# Patient Record
Sex: FEMALE | Race: WHITE | Employment: FULL TIME
[De-identification: names, ages, dates, MRNs, and addresses within clinical notes are randomized per-mention and may not be internally consistent; named-entity substitution may affect disease eponyms.]

---

## 2017-02-16 ENCOUNTER — OFFICE VISIT (OUTPATIENT)
Dept: FAMILY MEDICINE CLINIC | Facility: CLINIC | Age: 22
End: 2017-02-16

## 2017-02-16 VITALS
DIASTOLIC BLOOD PRESSURE: 64 MMHG | TEMPERATURE: 97.4 F | HEART RATE: 98 BPM | OXYGEN SATURATION: 98 % | SYSTOLIC BLOOD PRESSURE: 112 MMHG | BODY MASS INDEX: 44.83 KG/M2 | WEIGHT: 253 LBS | HEIGHT: 63 IN

## 2017-02-16 DIAGNOSIS — H65.93 OME (OTITIS MEDIA WITH EFFUSION), BILATERAL: ICD-10-CM

## 2017-02-16 DIAGNOSIS — J01.40 ACUTE NON-RECURRENT PANSINUSITIS: Primary | ICD-10-CM

## 2017-02-16 PROCEDURE — 99213 OFFICE O/P EST LOW 20 MIN: CPT | Performed by: NURSE PRACTITIONER

## 2017-02-16 RX ORDER — PREDNISONE 20 MG/1
TABLET ORAL
COMMUNITY
Start: 2017-02-13 | End: 2017-02-16 | Stop reason: ALTCHOICE

## 2017-02-16 RX ORDER — AMOXICILLIN AND CLAVULANATE POTASSIUM 875; 125 MG/1; MG/1
1 TABLET, FILM COATED ORAL 2 TIMES DAILY
Qty: 20 TABLET | Refills: 0 | Status: SHIPPED | OUTPATIENT
Start: 2017-02-16 | End: 2017-02-26

## 2017-02-16 RX ORDER — GUAIFENESIN/DEXTROMETHORPHAN 100-10MG/5
5-10 SYRUP ORAL
COMMUNITY
Start: 2017-02-13 | End: 2017-02-16 | Stop reason: ALTCHOICE

## 2017-02-16 RX ORDER — GUAIFENESIN 600 MG/1
600 TABLET, EXTENDED RELEASE ORAL 2 TIMES DAILY
Qty: 20 TABLET | Refills: 0 | Status: SHIPPED | OUTPATIENT
Start: 2017-02-16 | End: 2017-02-26

## 2017-02-16 ASSESSMENT — ENCOUNTER SYMPTOMS
SHORTNESS OF BREATH: 1
SINUS PRESSURE: 1
SORE THROAT: 1
HOARSE VOICE: 1
COUGH: 1

## 2017-02-20 ENCOUNTER — TELEPHONE (OUTPATIENT)
Dept: FAMILY MEDICINE CLINIC | Facility: CLINIC | Age: 22
End: 2017-02-20

## 2018-02-21 ENCOUNTER — OFFICE VISIT (OUTPATIENT)
Dept: PRIMARY CARE CLINIC | Age: 23
End: 2018-02-21
Payer: COMMERCIAL

## 2018-02-21 VITALS
DIASTOLIC BLOOD PRESSURE: 93 MMHG | RESPIRATION RATE: 18 BRPM | BODY MASS INDEX: 43.02 KG/M2 | HEART RATE: 96 BPM | TEMPERATURE: 97.5 F | HEIGHT: 64 IN | OXYGEN SATURATION: 95 % | WEIGHT: 252 LBS | SYSTOLIC BLOOD PRESSURE: 130 MMHG

## 2018-02-21 DIAGNOSIS — J01.00 ACUTE MAXILLARY SINUSITIS, RECURRENCE NOT SPECIFIED: Primary | ICD-10-CM

## 2018-02-21 PROCEDURE — 99213 OFFICE O/P EST LOW 20 MIN: CPT | Performed by: NURSE PRACTITIONER

## 2018-02-21 RX ORDER — AMOXICILLIN AND CLAVULANATE POTASSIUM 875; 125 MG/1; MG/1
1 TABLET, FILM COATED ORAL 2 TIMES DAILY
Qty: 20 TABLET | Refills: 0 | Status: SHIPPED | OUTPATIENT
Start: 2018-02-21 | End: 2018-03-03

## 2018-02-21 ASSESSMENT — ENCOUNTER SYMPTOMS
RHINORRHEA: 0
DIARRHEA: 0
SHORTNESS OF BREATH: 0
WHEEZING: 0
NAUSEA: 0
VOMITING: 0
COUGH: 0
SORE THROAT: 0

## 2018-02-21 NOTE — PROGRESS NOTES
Gastrointestinal: Negative for diarrhea, nausea and vomiting. Skin: Negative for rash and wound. Neurological: Positive for headaches. Negative for dizziness and light-headedness. Objective:     Physical Exam   Constitutional: She is oriented to person, place, and time. She appears well-developed and well-nourished. She is cooperative. She does not appear ill. No distress. HENT:   Head: Normocephalic and atraumatic. Right Ear: Hearing, external ear and ear canal normal. No mastoid tenderness. Tympanic membrane is not injected, not erythematous and not bulging. A middle ear effusion (pale white fluid) is present. Left Ear: Hearing, external ear and ear canal normal. No mastoid tenderness. Tympanic membrane is not injected, not erythematous and not bulging. A middle ear effusion (pale white fluid) is present. Nose: Mucosal edema and rhinorrhea present. Right sinus exhibits maxillary sinus tenderness. Right sinus exhibits no frontal sinus tenderness. Left sinus exhibits maxillary sinus tenderness. Left sinus exhibits no frontal sinus tenderness. Mouth/Throat: Uvula is midline and mucous membranes are normal. Oropharyngeal exudate (thick yellow secretions posterior pharynx) present. No posterior oropharyngeal edema, posterior oropharyngeal erythema or tonsillar abscesses. Eyes: Conjunctivae are normal. Pupils are equal, round, and reactive to light. Right eye exhibits no discharge. Left eye exhibits no discharge. No scleral icterus. Cardiovascular: Normal rate, regular rhythm, S1 normal, S2 normal, normal heart sounds and intact distal pulses. Exam reveals no gallop and no friction rub. No murmur heard. Pulmonary/Chest: Effort normal and breath sounds normal. No accessory muscle usage. No respiratory distress. She has no decreased breath sounds. She has no wheezes. She has no rhonchi. She has no rales. Occasional moist cough. Breath sounds clear B/L anterior and posterior lobes.   Chest greater than 103 degrees. · Follow up as needed with PCP if symptoms worsen or do not improve    Ellie received counseling on the following healthy behaviors: medication adherence. Patient given educational materials - see patient instructions. Discussed use, benefit, and side effects of prescribed medications. Treatment plan discussed at visit. Continue routine health care follow up. All patient questions answered. Pt voiced understanding.       Electronically signed by Sagar Bartlett CNP on 2/21/2018 at 4:20 PM

## 2018-02-21 NOTE — PATIENT INSTRUCTIONS
effect. Drug information contained herein may be time sensitive. [employer] information has been compiled for use by healthcare practitioners and consumers in the [place] and therefore [employer] does not warrant that uses outside of the [place] are appropriate, unless specifically indicated otherwise. [facility] drug information does not endorse drugs, diagnose patients or recommend therapy. [employer] drug information is an informational resource designed to assist licensed healthcare practitioners in caring for their patients and/or to serve consumers viewing this service as a supplement to, and not a substitute for, the expertise, skill, knowledge and judgment of healthcare practitioners. The absence of a warning for a given drug or drug combination in no way should be construed to indicate that the drug or drug combination is safe, effective or appropriate for any given patient. [facility] does not assume any responsibility for any aspect of healthcare administered with the aid of information [facility] provides. The information contained herein is not intended to cover all possible uses, directions, precautions, warnings, drug interactions, allergic reactions, or adverse effects. If you have questions about the drugs you are taking, check with your doctor, nurse or pharmacist.  Copyright 6730-9320 [address]. Version: 9.01. Revision date: 2/1/2011. Care instructions adapted under license by [facility] ([facility]). If you have questions about a medical condition or this instruction, always ask your healthcare professional. [place] [address] any warranty or liability for your use of this information. Sinusitis: Care Instructions  Your Care Instructions    Sinusitis is an infection of the lining of the sinus cavities in your head. Sinusitis often follows a cold. It causes pain and pressure in your head and face. In most cases, sinusitis gets better on its own in 1 to 2 weeks.  But some mild

## 2018-02-21 NOTE — LETTER
[facility] [id number]  Phone: [phone]  Fax: Rakan Thompson , [place]        February 21, 2018     Patient: Kaley Shin   YOB: 1995   Date of Visit: 2/21/2018       To Whom it May Concern:    Willard Morales was seen in my clinic on 2/21/2018. She may return to work on 02/22/2018. Please excuse for  02/21/2018. If you have any questions or concerns, please don't hesitate to call.     Sincerely,         Paulino Brar, CNP

## 2018-02-22 ENCOUNTER — OFFICE VISIT (OUTPATIENT)
Dept: FAMILY MEDICINE CLINIC | Age: 23
End: 2018-02-22
Payer: COMMERCIAL

## 2018-02-22 VITALS
DIASTOLIC BLOOD PRESSURE: 80 MMHG | SYSTOLIC BLOOD PRESSURE: 120 MMHG | BODY MASS INDEX: 43.87 KG/M2 | HEIGHT: 64 IN | HEART RATE: 72 BPM | RESPIRATION RATE: 18 BRPM | WEIGHT: 257 LBS

## 2018-02-22 PROCEDURE — 99213 OFFICE O/P EST LOW 20 MIN: CPT | Performed by: INTERNAL MEDICINE

## 2018-02-22 ASSESSMENT — ENCOUNTER SYMPTOMS
COUGH: 0
NAUSEA: 0
BLURRED VISION: 0
HEARTBURN: 0
SHORTNESS OF BREATH: 0
ABDOMINAL PAIN: 0
SORE THROAT: 0

## 2018-02-22 NOTE — PROGRESS NOTES
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 She has BMI 44.2 and very concerned about it    She says she was around 170 lbs in high school, then at age 24 she developed POS   and since then has been gaining more weight steadily. She is not on hormonal therapy and does not follow with her OBGYN. She tried eating healthy, eats one time a day, usually lunch, does not drink soda products, avoids cookies and sweets, does not eat dairy products, including ice cream. Says eats a lot of salad, veggies. She is going to gym 5 times per week. She is frustrated that despite all  these efforts she continues to gain weight. She says obesity runs in her family. Her younger sister is over 300 lbs, her mom and other family members are all \" heavy people\"  She worries about developing diabetes                  Past Medical History:     Past Medical History:   Diagnosis Date    Bronchitis       Reviewed all health maintenance requirements and ordered appropriate tests  Health Maintenance Due   Topic Date Due    HIV screen  2009    Chlamydia screen  2010    DTaP/Tdap/Td vaccine (1 - Tdap) 2013    Pneumococcal med risk (1 of 1 - PPSV23) 2013    Cervical cancer screen  2015       Past Surgical History:     Past Surgical History:   Procedure Laterality Date    APPENDECTOMY      TONSILLECTOMY AND ADENOIDECTOMY          Medications:       Prior to Admission medications    Medication Sig Start Date End Date Taking?  Authorizing Provider   amoxicillin-clavulanate (AUGMENTIN) 875-125 MG per tablet Take 1 tablet by mouth 2 times daily for 10 days 2/21/18 3/3/18 Yes Hernan Rocha CNP   aspirin-acetaminophen-caffeine (EXCEDRIN MIGRAINE) murmur heard. Pulmonary/Chest: Effort normal and breath sounds normal. She has no wheezes. She has no rales. Abdominal: Soft. Bowel sounds are normal. She exhibits no distension and no mass. There is no tenderness. Musculoskeletal: Normal range of motion. She exhibits no edema or deformity. Lymphadenopathy:     She has no cervical adenopathy. Neurological: She is alert and oriented to person, place, and time. Skin: Skin is warm and dry. No rash noted. Psychiatric: She has a normal mood and affect. Her behavior is normal. Judgment normal.   Vitals reviewed. Data:     Lab Results   Component Value Date     04/25/2013    K 4.0 04/25/2013     04/25/2013    CO2 28 04/25/2013    BUN 8 04/25/2013    CREATININE 0.58 04/25/2013    GLUCOSE 85 04/25/2013     Lab Results   Component Value Date    WBC 7.8 04/25/2013    RBC 4.51 04/25/2013    HGB 12.0 04/25/2013    HCT 38.0 04/25/2013    MCV 84.3 04/25/2013    MCH 26.6 04/25/2013    MCHC 31.6 04/25/2013    RDW 13.1 04/25/2013     04/25/2013    MPV NOT REPORTED 04/25/2013     Lab Results   Component Value Date    TSH 1.46 04/25/2013     Lab Results   Component Value Date    CHOL 140 04/25/2013    HDL 32 04/25/2013    LABA1C 5.6 04/25/2013          Assessment & Plan       1. Class 3 obesity without serious comorbidity with body mass index (BMI) of 40.0 to 44.9 in adult, unspecified obesity type [facility])   Discussed importance of dieting and exercising before trying meds. She is agreeable for a referral to a nutritionist, continue current exercise program. Also advised to follow up with her OBGYN to discuss hormonal therapy for POS  Will reevaluate in 4 weeks Basic Metabolic Panel    TSH with Reflex    Lipid Panel    [facility] Nutrition Services[facility]ette Reece                   Completed Refills   Requested Prescriptions      No prescriptions requested or ordered in this encounter     No Follow-up on file.   No orders of the defined types were

## 2018-07-19 ENCOUNTER — HOSPITAL ENCOUNTER (EMERGENCY)
Age: 23
Discharge: HOME | End: 2018-07-19
Attending: FAMILY MEDICINE
Payer: COMMERCIAL

## 2018-07-19 VITALS
OXYGEN SATURATION: 98 % | SYSTOLIC BLOOD PRESSURE: 148 MMHG | RESPIRATION RATE: 20 BRPM | WEIGHT: 240 LBS | BODY MASS INDEX: 41.2 KG/M2 | HEART RATE: 78 BPM | TEMPERATURE: 98.4 F | DIASTOLIC BLOOD PRESSURE: 96 MMHG

## 2018-07-19 DIAGNOSIS — R10.84 GENERALIZED ABDOMINAL PAIN: Primary | ICD-10-CM

## 2018-07-19 PROCEDURE — 6360000002 HC RX W HCPCS: Performed by: FAMILY MEDICINE

## 2018-07-19 PROCEDURE — 99283 EMERGENCY DEPT VISIT LOW MDM: CPT

## 2018-07-19 PROCEDURE — 6370000000 HC RX 637 (ALT 250 FOR IP): Performed by: FAMILY MEDICINE

## 2018-07-19 RX ORDER — OMEPRAZOLE 20 MG/1
20 CAPSULE, DELAYED RELEASE ORAL DAILY
Qty: 30 CAPSULE | Refills: 0 | Status: SHIPPED | OUTPATIENT
Start: 2018-07-19 | End: 2018-09-07 | Stop reason: ALTCHOICE

## 2018-07-19 RX ORDER — PANTOPRAZOLE SODIUM 40 MG/1
40 TABLET, DELAYED RELEASE ORAL ONCE
Status: COMPLETED | OUTPATIENT
Start: 2018-07-19 | End: 2018-07-19

## 2018-07-19 RX ORDER — ONDANSETRON 4 MG/1
4 TABLET, ORALLY DISINTEGRATING ORAL ONCE
Status: COMPLETED | OUTPATIENT
Start: 2018-07-19 | End: 2018-07-19

## 2018-07-19 RX ADMIN — ONDANSETRON 4 MG: 4 TABLET, ORALLY DISINTEGRATING ORAL at 21:16

## 2018-07-19 RX ADMIN — PANTOPRAZOLE SODIUM 40 MG: 40 TABLET, DELAYED RELEASE ORAL at 21:15

## 2018-07-19 ASSESSMENT — PAIN SCALES - GENERAL: PAINLEVEL_OUTOF10: 7

## 2018-07-19 ASSESSMENT — PAIN DESCRIPTION - DESCRIPTORS: DESCRIPTORS: CRAMPING;CONSTANT

## 2018-07-19 ASSESSMENT — PAIN DESCRIPTION - ORIENTATION: ORIENTATION: MID

## 2018-07-19 ASSESSMENT — PAIN DESCRIPTION - PAIN TYPE: TYPE: ACUTE PAIN

## 2018-07-19 ASSESSMENT — PAIN DESCRIPTION - LOCATION: LOCATION: ABDOMEN

## 2018-07-20 ENCOUNTER — HOSPITAL ENCOUNTER (OUTPATIENT)
Age: 23
Discharge: HOME | End: 2018-07-20
Payer: COMMERCIAL

## 2018-07-20 LAB
ABSOLUTE EOS #: 0.4 K/UL (ref 0–0.4)
ABSOLUTE IMMATURE GRANULOCYTE: ABNORMAL K/UL (ref 0–0.3)
ABSOLUTE LYMPH #: 2.4 K/UL (ref 1–4.8)
ABSOLUTE MONO #: 0.8 K/UL (ref 0–1)
ALBUMIN SERPL-MCNC: 3.8 G/DL (ref 3.5–5.2)
ALBUMIN/GLOBULIN RATIO: NORMAL (ref 1–2.5)
ALP BLD-CCNC: 81 U/L (ref 35–104)
ALT SERPL-CCNC: 32 U/L (ref 5–33)
ANION GAP SERPL CALCULATED.3IONS-SCNC: 14 MMOL/L (ref 9–17)
AST SERPL-CCNC: 29 U/L
BASOPHILS # BLD: 1 % (ref 0–2)
BASOPHILS ABSOLUTE: 0.1 K/UL (ref 0–0.2)
BILIRUB SERPL-MCNC: 0.43 MG/DL (ref 0.3–1.2)
BUN BLDV-MCNC: 8 MG/DL (ref 6–20)
BUN/CREAT BLD: 10 (ref 9–20)
CALCIUM SERPL-MCNC: 9.6 MG/DL (ref 8.6–10.4)
CHLORIDE BLD-SCNC: 100 MMOL/L (ref 98–107)
CHOLESTEROL/HDL RATIO: 3.8
CHOLESTEROL: 123 MG/DL
CO2: 25 MMOL/L (ref 20–31)
CREAT SERPL-MCNC: 0.79 MG/DL (ref 0.5–0.9)
DIFFERENTIAL TYPE: YES
EOSINOPHILS RELATIVE PERCENT: 4 % (ref 0–5)
GFR AFRICAN AMERICAN: >60 ML/MIN
GFR NON-AFRICAN AMERICAN: >60 ML/MIN
GFR SERPL CREATININE-BSD FRML MDRD: NORMAL ML/MIN/{1.73_M2}
GFR SERPL CREATININE-BSD FRML MDRD: NORMAL ML/MIN/{1.73_M2}
GLUCOSE BLD-MCNC: 97 MG/DL (ref 70–99)
HCT VFR BLD CALC: 45.8 % (ref 36–46)
HDLC SERPL-MCNC: 32 MG/DL
HEMOGLOBIN: 14.7 G/DL (ref 12–16)
IMMATURE GRANULOCYTES: ABNORMAL %
LDL CHOLESTEROL: 61 MG/DL (ref 0–130)
LYMPHOCYTES # BLD: 23 % (ref 15–40)
MCH RBC QN AUTO: 27.2 PG (ref 26–34)
MCHC RBC AUTO-ENTMCNC: 32.1 G/DL (ref 31–37)
MCV RBC AUTO: 84.9 FL (ref 80–100)
MONOCYTES # BLD: 7 % (ref 4–8)
NRBC AUTOMATED: ABNORMAL PER 100 WBC
PATIENT FASTING?: YES
PDW BLD-RTO: 14.4 % (ref 12.1–15.2)
PLATELET # BLD: 376 K/UL (ref 140–450)
PLATELET ESTIMATE: ABNORMAL
PMV BLD AUTO: ABNORMAL FL (ref 6–12)
POTASSIUM SERPL-SCNC: 4.4 MMOL/L (ref 3.7–5.3)
RBC # BLD: 5.39 M/UL (ref 4–5.2)
RBC # BLD: ABNORMAL 10*6/UL
SEG NEUTROPHILS: 65 % (ref 47–75)
SEGMENTED NEUTROPHILS ABSOLUTE COUNT: 6.9 K/UL (ref 2.5–7)
SODIUM BLD-SCNC: 139 MMOL/L (ref 135–144)
TOTAL PROTEIN: 7.1 G/DL (ref 6.4–8.3)
TRIGL SERPL-MCNC: 148 MG/DL
TSH SERPL DL<=0.05 MIU/L-ACNC: 2.25 MIU/L (ref 0.3–5)
VLDLC SERPL CALC-MCNC: ABNORMAL MG/DL (ref 1–30)
WBC # BLD: 10.5 K/UL (ref 3.5–11)
WBC # BLD: ABNORMAL 10*3/UL

## 2018-07-20 PROCEDURE — 80061 LIPID PANEL: CPT

## 2018-07-20 PROCEDURE — 84443 ASSAY THYROID STIM HORMONE: CPT

## 2018-07-20 PROCEDURE — 80053 COMPREHEN METABOLIC PANEL: CPT

## 2018-07-20 PROCEDURE — 85025 COMPLETE CBC W/AUTO DIFF WBC: CPT

## 2018-07-20 PROCEDURE — 36415 COLL VENOUS BLD VENIPUNCTURE: CPT

## 2018-07-23 ENCOUNTER — HOSPITAL ENCOUNTER (OUTPATIENT)
Dept: CT IMAGING | Age: 23
Discharge: HOME | End: 2018-07-25
Payer: COMMERCIAL

## 2018-07-23 ENCOUNTER — OFFICE VISIT (OUTPATIENT)
Dept: FAMILY MEDICINE CLINIC | Age: 23
End: 2018-07-23
Payer: COMMERCIAL

## 2018-07-23 ENCOUNTER — HOSPITAL ENCOUNTER (OUTPATIENT)
Age: 23
Discharge: HOME | End: 2018-07-23
Payer: COMMERCIAL

## 2018-07-23 VITALS
HEART RATE: 84 BPM | WEIGHT: 245 LBS | BODY MASS INDEX: 41.83 KG/M2 | HEIGHT: 64 IN | DIASTOLIC BLOOD PRESSURE: 84 MMHG | OXYGEN SATURATION: 97 % | SYSTOLIC BLOOD PRESSURE: 126 MMHG | RESPIRATION RATE: 18 BRPM

## 2018-07-23 DIAGNOSIS — R10.11 RIGHT UPPER QUADRANT ABDOMINAL PAIN: ICD-10-CM

## 2018-07-23 DIAGNOSIS — R10.11 RIGHT UPPER QUADRANT ABDOMINAL PAIN: Primary | ICD-10-CM

## 2018-07-23 LAB
ABSOLUTE EOS #: 0.5 K/UL (ref 0–0.4)
ABSOLUTE IMMATURE GRANULOCYTE: ABNORMAL K/UL (ref 0–0.3)
ABSOLUTE LYMPH #: 2.9 K/UL (ref 1–4.8)
ABSOLUTE MONO #: 0.9 K/UL (ref 0–1)
ALBUMIN SERPL-MCNC: 4.1 G/DL (ref 3.5–5.2)
ALBUMIN/GLOBULIN RATIO: ABNORMAL (ref 1–2.5)
ALP BLD-CCNC: 91 U/L (ref 35–104)
ALT SERPL-CCNC: 25 U/L (ref 5–33)
ANION GAP SERPL CALCULATED.3IONS-SCNC: 12 MMOL/L (ref 9–17)
AST SERPL-CCNC: 19 U/L
BASOPHILS # BLD: 1 % (ref 0–2)
BASOPHILS ABSOLUTE: 0.1 K/UL (ref 0–0.2)
BILIRUB SERPL-MCNC: 0.28 MG/DL (ref 0.3–1.2)
BILIRUBIN DIRECT: <0.08 MG/DL
BILIRUBIN, INDIRECT: ABNORMAL MG/DL (ref 0–1)
BUN BLDV-MCNC: 7 MG/DL (ref 6–20)
BUN/CREAT BLD: 9 (ref 9–20)
CALCIUM SERPL-MCNC: 9.7 MG/DL (ref 8.6–10.4)
CHLORIDE BLD-SCNC: 102 MMOL/L (ref 98–107)
CO2: 27 MMOL/L (ref 20–31)
CREAT SERPL-MCNC: 0.75 MG/DL (ref 0.5–0.9)
DIFFERENTIAL TYPE: YES
EOSINOPHILS RELATIVE PERCENT: 5 % (ref 0–5)
GFR AFRICAN AMERICAN: >60 ML/MIN
GFR NON-AFRICAN AMERICAN: >60 ML/MIN
GFR SERPL CREATININE-BSD FRML MDRD: ABNORMAL ML/MIN/{1.73_M2}
GFR SERPL CREATININE-BSD FRML MDRD: ABNORMAL ML/MIN/{1.73_M2}
GLOBULIN: ABNORMAL G/DL (ref 1.5–3.8)
GLUCOSE BLD-MCNC: 108 MG/DL (ref 70–99)
HCT VFR BLD CALC: 45.3 % (ref 36–46)
HEMOGLOBIN: 14.7 G/DL (ref 12–16)
IMMATURE GRANULOCYTES: ABNORMAL %
LIPASE: 49 U/L (ref 13–60)
LYMPHOCYTES # BLD: 25 % (ref 15–40)
MCH RBC QN AUTO: 27.6 PG (ref 26–34)
MCHC RBC AUTO-ENTMCNC: 32.5 G/DL (ref 31–37)
MCV RBC AUTO: 84.7 FL (ref 80–100)
MONOCYTES # BLD: 8 % (ref 4–8)
NRBC AUTOMATED: ABNORMAL PER 100 WBC
PDW BLD-RTO: 14.3 % (ref 12.1–15.2)
PLATELET # BLD: 371 K/UL (ref 140–450)
PLATELET ESTIMATE: ABNORMAL
PMV BLD AUTO: ABNORMAL FL (ref 6–12)
POTASSIUM SERPL-SCNC: 4.5 MMOL/L (ref 3.7–5.3)
RBC # BLD: 5.34 M/UL (ref 4–5.2)
RBC # BLD: ABNORMAL 10*6/UL
SEG NEUTROPHILS: 61 % (ref 47–75)
SEGMENTED NEUTROPHILS ABSOLUTE COUNT: 7.3 K/UL (ref 2.5–7)
SODIUM BLD-SCNC: 141 MMOL/L (ref 135–144)
TOTAL PROTEIN: 7.3 G/DL (ref 6.4–8.3)
WBC # BLD: 11.8 K/UL (ref 3.5–11)
WBC # BLD: ABNORMAL 10*3/UL

## 2018-07-23 PROCEDURE — 74176 CT ABD & PELVIS W/O CONTRAST: CPT

## 2018-07-23 PROCEDURE — 80076 HEPATIC FUNCTION PANEL: CPT

## 2018-07-23 PROCEDURE — 99213 OFFICE O/P EST LOW 20 MIN: CPT | Performed by: INTERNAL MEDICINE

## 2018-07-23 PROCEDURE — 80048 BASIC METABOLIC PNL TOTAL CA: CPT

## 2018-07-23 PROCEDURE — 85025 COMPLETE CBC W/AUTO DIFF WBC: CPT

## 2018-07-23 PROCEDURE — 36415 COLL VENOUS BLD VENIPUNCTURE: CPT

## 2018-07-23 PROCEDURE — G0444 DEPRESSION SCREEN ANNUAL: HCPCS | Performed by: INTERNAL MEDICINE

## 2018-07-23 PROCEDURE — 83690 ASSAY OF LIPASE: CPT

## 2018-07-23 ASSESSMENT — PATIENT HEALTH QUESTIONNAIRE - PHQ9
SUM OF ALL RESPONSES TO PHQ9 QUESTIONS 1 & 2: 6
5. POOR APPETITE OR OVEREATING: 1
SUM OF ALL RESPONSES TO PHQ QUESTIONS 1-9: 12
3. TROUBLE FALLING OR STAYING ASLEEP: 2
7. TROUBLE CONCENTRATING ON THINGS, SUCH AS READING THE NEWSPAPER OR WATCHING TELEVISION: 0
8. MOVING OR SPEAKING SO SLOWLY THAT OTHER PEOPLE COULD HAVE NOTICED. OR THE OPPOSITE, BEING SO FIGETY OR RESTLESS THAT YOU HAVE BEEN MOVING AROUND A LOT MORE THAN USUAL: 0
4. FEELING TIRED OR HAVING LITTLE ENERGY: 3
1. LITTLE INTEREST OR PLEASURE IN DOING THINGS: 3
10. IF YOU CHECKED OFF ANY PROBLEMS, HOW DIFFICULT HAVE THESE PROBLEMS MADE IT FOR YOU TO DO YOUR WORK, TAKE CARE OF THINGS AT HOME, OR GET ALONG WITH OTHER PEOPLE: 1
9. THOUGHTS THAT YOU WOULD BE BETTER OFF DEAD, OR OF HURTING YOURSELF: 0
2. FEELING DOWN, DEPRESSED OR HOPELESS: 3
6. FEELING BAD ABOUT YOURSELF - OR THAT YOU ARE A FAILURE OR HAVE LET YOURSELF OR YOUR FAMILY DOWN: 0

## 2018-07-23 ASSESSMENT — ENCOUNTER SYMPTOMS
SHORTNESS OF BREATH: 0
BLURRED VISION: 0
NAUSEA: 1
SORE THROAT: 0
CONSTIPATION: 1
VOMITING: 0
HEMATOCHEZIA: 0
BELCHING: 1
COUGH: 0
HEARTBURN: 0
ABDOMINAL PAIN: 1

## 2018-07-23 ASSESSMENT — CROHNS DISEASE ACTIVITY INDEX (CDAI): CDAI SCORE: 0

## 2018-07-23 NOTE — LETTER
[address]  [address] [place] [address]  Phone: [phone]  Fax: [phone]    Lesia Meredith MD        July 23, 2018     Patient: Arian Limon   YOB: 1995   Date of Visit: 7/23/2018       To Whom It May Concern:    Ms. Annabella Ocampo is currently under my care and she is having acute health problems requiring medical  work up and possibly treatment. It is my medical opinion that Carolyn Guerra may return to work on 7/25/18. If you have any questions or concerns, please don't hesitate to call.     Sincerely,        Lesia Meredith MD

## 2018-07-23 NOTE — PROGRESS NOTES
abdominal pain     The patient's pain is worsening, will order CT scan of abdomen and pelvis for evaluation, repeat labs. Will await results and determine further plan of care. Pregnancy, Urine    CT ABDOMEN PELVIS WO CONTRAST Additional Contrast? None    Hepatic Function Panel    Lipase    CBC Auto Differential    Basic Metabolic Panel                   Completed Refills   Requested Prescriptions      No prescriptions requested or ordered in this encounter     Return if symptoms worsen or fail to improve. No orders of the defined types were placed in this encounter. Orders Placed This Encounter   Procedures    CT ABDOMEN PELVIS WO CONTRAST Additional Contrast? None     Standing Status:   Future     Standing Expiration Date:   7/23/2019     Order Specific Question:   Additional Contrast?     Answer:   None    Pregnancy, Urine    Hepatic Function Panel     Standing Status:   Future     Standing Expiration Date:   7/23/2019    Lipase     Standing Status:   Future     Standing Expiration Date:   7/23/2019    CBC Auto Differential     Standing Status:   Future     Standing Expiration Date:   7/23/2019    Basic Metabolic Panel     Standing Status:   Future     Standing Expiration Date:   7/23/2019         There are no Patient Instructions on file for this visit.     Electronically signed by Kathy Kay MD on 7/23/2018 at 12:01 PM           Completed Refills   Requested Prescriptions      No prescriptions requested or ordered in this encounter

## 2018-07-24 DIAGNOSIS — R10.9 ABDOMINAL PAIN, UNSPECIFIED ABDOMINAL LOCATION: Primary | ICD-10-CM

## 2018-08-01 ENCOUNTER — HOSPITAL ENCOUNTER (EMERGENCY)
Age: 23
Discharge: HOME | End: 2018-08-01
Attending: FAMILY MEDICINE
Payer: COMMERCIAL

## 2018-08-01 VITALS
DIASTOLIC BLOOD PRESSURE: 65 MMHG | WEIGHT: 240 LBS | OXYGEN SATURATION: 96 % | HEART RATE: 70 BPM | BODY MASS INDEX: 41.2 KG/M2 | TEMPERATURE: 97.6 F | SYSTOLIC BLOOD PRESSURE: 118 MMHG | RESPIRATION RATE: 18 BRPM

## 2018-08-01 DIAGNOSIS — R10.11 RIGHT UPPER QUADRANT ABDOMINAL PAIN: Primary | ICD-10-CM

## 2018-08-01 LAB
ABSOLUTE EOS #: 0.5 K/UL (ref 0–0.4)
ABSOLUTE IMMATURE GRANULOCYTE: ABNORMAL K/UL (ref 0–0.3)
ABSOLUTE LYMPH #: 3.6 K/UL (ref 1–4.8)
ABSOLUTE MONO #: 1.1 K/UL (ref 0–1)
ALBUMIN SERPL-MCNC: 3.5 G/DL (ref 3.5–5.2)
ALBUMIN/GLOBULIN RATIO: ABNORMAL (ref 1–2.5)
ALP BLD-CCNC: 84 U/L (ref 35–104)
ALT SERPL-CCNC: 25 U/L (ref 5–33)
ANION GAP SERPL CALCULATED.3IONS-SCNC: 11 MMOL/L (ref 9–17)
AST SERPL-CCNC: 18 U/L
BASOPHILS # BLD: 0 % (ref 0–2)
BASOPHILS ABSOLUTE: 0.1 K/UL (ref 0–0.2)
BILIRUB SERPL-MCNC: 0.22 MG/DL (ref 0.3–1.2)
BUN BLDV-MCNC: 5 MG/DL (ref 6–20)
BUN/CREAT BLD: 7 (ref 9–20)
CALCIUM SERPL-MCNC: 9.2 MG/DL (ref 8.6–10.4)
CHLORIDE BLD-SCNC: 104 MMOL/L (ref 98–107)
CO2: 25 MMOL/L (ref 20–31)
CREAT SERPL-MCNC: 0.69 MG/DL (ref 0.5–0.9)
DIFFERENTIAL TYPE: YES
EOSINOPHILS RELATIVE PERCENT: 4 % (ref 0–5)
GFR AFRICAN AMERICAN: >60 ML/MIN
GFR NON-AFRICAN AMERICAN: >60 ML/MIN
GFR SERPL CREATININE-BSD FRML MDRD: ABNORMAL ML/MIN/{1.73_M2}
GFR SERPL CREATININE-BSD FRML MDRD: ABNORMAL ML/MIN/{1.73_M2}
GLUCOSE BLD-MCNC: 104 MG/DL (ref 70–99)
HCT VFR BLD CALC: 42.3 % (ref 36–46)
HEMOGLOBIN: 13.8 G/DL (ref 12–16)
IMMATURE GRANULOCYTES: ABNORMAL %
LIPASE: 59 U/L (ref 13–60)
LYMPHOCYTES # BLD: 28 % (ref 15–40)
MCH RBC QN AUTO: 27.6 PG (ref 26–34)
MCHC RBC AUTO-ENTMCNC: 32.5 G/DL (ref 31–37)
MCV RBC AUTO: 84.9 FL (ref 80–100)
MONOCYTES # BLD: 8 % (ref 4–8)
NRBC AUTOMATED: ABNORMAL PER 100 WBC
PDW BLD-RTO: 14.1 % (ref 12.1–15.2)
PLATELET # BLD: 346 K/UL (ref 140–450)
PLATELET ESTIMATE: ABNORMAL
PMV BLD AUTO: ABNORMAL FL (ref 6–12)
POTASSIUM SERPL-SCNC: 3.9 MMOL/L (ref 3.7–5.3)
RBC # BLD: 4.98 M/UL (ref 4–5.2)
RBC # BLD: ABNORMAL 10*6/UL
SEG NEUTROPHILS: 60 % (ref 47–75)
SEGMENTED NEUTROPHILS ABSOLUTE COUNT: 7.8 K/UL (ref 2.5–7)
SODIUM BLD-SCNC: 140 MMOL/L (ref 135–144)
TOTAL PROTEIN: 6.3 G/DL (ref 6.4–8.3)
WBC # BLD: 13 K/UL (ref 3.5–11)
WBC # BLD: ABNORMAL 10*3/UL

## 2018-08-01 PROCEDURE — 6360000002 HC RX W HCPCS: Performed by: FAMILY MEDICINE

## 2018-08-01 PROCEDURE — 36415 COLL VENOUS BLD VENIPUNCTURE: CPT

## 2018-08-01 PROCEDURE — 99283 EMERGENCY DEPT VISIT LOW MDM: CPT

## 2018-08-01 PROCEDURE — 80053 COMPREHEN METABOLIC PANEL: CPT

## 2018-08-01 PROCEDURE — 85025 COMPLETE CBC W/AUTO DIFF WBC: CPT

## 2018-08-01 PROCEDURE — 83690 ASSAY OF LIPASE: CPT

## 2018-08-01 PROCEDURE — 96372 THER/PROPH/DIAG INJ SC/IM: CPT

## 2018-08-01 RX ORDER — NALBUPHINE HCL 10 MG/ML
20 AMPUL (ML) INJECTION ONCE
Status: COMPLETED | OUTPATIENT
Start: 2018-08-01 | End: 2018-08-01

## 2018-08-01 RX ORDER — TRAMADOL HYDROCHLORIDE 50 MG/1
50 TABLET ORAL EVERY 6 HOURS PRN
Qty: 12 TABLET | Refills: 0 | Status: SHIPPED | OUTPATIENT
Start: 2018-08-01 | End: 2018-08-04

## 2018-08-01 RX ORDER — ONDANSETRON 2 MG/ML
4 INJECTION INTRAMUSCULAR; INTRAVENOUS ONCE
Status: COMPLETED | OUTPATIENT
Start: 2018-08-01 | End: 2018-08-01

## 2018-08-01 RX ORDER — ONDANSETRON 4 MG/1
4 TABLET, ORALLY DISINTEGRATING ORAL EVERY 8 HOURS PRN
Qty: 6 TABLET | Refills: 0 | Status: SHIPPED | OUTPATIENT
Start: 2018-08-01

## 2018-08-01 RX ADMIN — ONDANSETRON 4 MG: 2 INJECTION INTRAMUSCULAR; INTRAVENOUS at 06:56

## 2018-08-01 RX ADMIN — NALBUPHINE HYDROCHLORIDE 20 MG: 10 INJECTION, SOLUTION INTRAMUSCULAR; INTRAVENOUS; SUBCUTANEOUS at 06:56

## 2018-08-01 ASSESSMENT — PAIN DESCRIPTION - LOCATION: LOCATION: ABDOMEN

## 2018-08-01 ASSESSMENT — PAIN DESCRIPTION - PAIN TYPE: TYPE: ACUTE PAIN

## 2018-08-01 ASSESSMENT — PAIN SCALES - GENERAL
PAINLEVEL_OUTOF10: 8
PAINLEVEL_OUTOF10: 4
PAINLEVEL_OUTOF10: 7

## 2018-08-01 NOTE — ED PROVIDER NOTES
eMERGENCY dEPARTMENT eNCOUnter      [address] [facility]    Chief Complaint   Patient presents with    Abdominal Pain     patient was in ED about 2 weeks ago for abd pain, states she followed up with PMD and had a CT and bloodwork and has an appointment with Dr Jan Aguero, but states she cant take the pain       HPI    Uma Foss is a 24 y.o. female who presents With abdominal pain that worsened through the night. It kept her from sleeping. The pain is severe at times. She has been taking Prilosec and some antacid. This is given no relief. The pain was slurred when she ate Doritos. The pain which was sharp started 20-30 minutes after eating. .  It is in the epigastric and right upper quadrant area. She is also has some dark stools. No gross blood. No hematemesis. She has been seen by PCP with CAT scan done. The CAT scan was unremarkable. No urinary obstruction. She is upcoming appointment appointment with Dr. Jose Jerome in. She is frustrated. Family history is positive for gallbladder disease. She does not have any personal history of endometriosis. She has had prior appendectomy. No history of colitis. No endometriosis but possible history of PTCA or edema. REVIEW OF SYSTEMS    All body systems reviewed with pertinent positives and negative findings mentioned in the history of present illness    PAST MEDICAL HISTORY    Past Medical History:   Diagnosis Date    Bronchitis        SURGICAL HISTORY    Past Surgical History:   Procedure Laterality Date    APPENDECTOMY      TONSILLECTOMY AND ADENOIDECTOMY  2000       CURRENT MEDICATIONS    Current Outpatient Rx   Medication Sig Dispense Refill    traMADol (ULTRAM) 50 MG tablet Take 1 tablet by mouth every 6 hours as needed for Pain for up to 3 days. Intended supply: 3 days. Take lowest dose possible to manage pain.  12 tablet 0    omeprazole (PRILOSEC) 20 MG delayed release capsule Take 1 capsule by mouth daily 30 capsule 0    aspirin-acetaminophen-caffeine (EXCEDRIN MIGRAINE) 250-250-65 MG per tablet Take 2 tablets by mouth every 6 hours as needed for Headaches         ALLERGIES    No Known Allergies    FAMILY HISTORY    Family History   Problem Relation Age of Onset    Diabetes Maternal Grandmother     Diabetes Maternal Grandfather     Diabetes Paternal Grandmother     Diabetes Paternal Grandfather        SOCIAL HISTORY    Social History     Social History    Marital status: Single     Spouse name: N/A    Number of children: N/A    Years of education: N/A     Social History Main Topics    Smoking status: Current Every Day Smoker     Packs/day: 1.00     Years: 3.00     Types: Cigarettes    Smokeless tobacco: Never Used    Alcohol use Yes      Comment: social    Drug use: No    Sexual activity: Not on file     Other Topics Concern    Not on file     Social History Narrative    No narrative on file       PHYSICAL EXAM    VITAL SIGNS: /65   Pulse 70   Temp 97.6 °F (36.4 °C) (Oral)   Resp 18   Wt 240 lb (108.9 kg)   LMP 06/27/2018   SpO2 96%   BMI 41.20 kg/m²   Constitutional:  Well developed, well nourished, 77-year-old female with large body habitus and no acute distress   Eyes:  PERRL, conjunctiva normal, sclerae white. HENT:  Atraumatic, external ears normal, nose normal, oropharynx moist. Neck supple   Respiratory:  No respiratory distress, normal breath sounds   Cardiovascular:  Normal rate, normal rhythm, no murmurs, no gallops, no rubs   GI:  Protuberant abdomen, active bowel sounds, no hepatomegaly, right upper quadrant tenderness. No inguinal mass palpated. :  No suprapubic distention. No inguinal mass. Musculoskeletal:  No significant tenderness of the extremities. No calf pain encountered. The patient shows controlled and coordinated movements of the extremities. Her gait is stable. Spine is grossly aligned. Integument:  Well hydrated and no generalized rash.   Some stretch marks of the

## 2018-08-01 NOTE — ED NOTES
patient was in ED about 2 weeks ago for abd pain, states she followed up with PMD and had a CT and bloodwork and has an appointment with Dr Miranda Abdi, but states she cant take the pain     Richard Kidd RN  08/01/18 [id number]

## 2018-08-03 ENCOUNTER — HOSPITAL ENCOUNTER (OUTPATIENT)
Dept: NUCLEAR MEDICINE | Age: 23
Discharge: HOME | End: 2018-08-05
Payer: COMMERCIAL

## 2018-08-03 VITALS — WEIGHT: 240 LBS | HEIGHT: 64 IN | BODY MASS INDEX: 40.97 KG/M2

## 2018-08-03 DIAGNOSIS — R10.11 RIGHT UPPER QUADRANT ABDOMINAL PAIN: ICD-10-CM

## 2018-08-03 PROCEDURE — A9537 TC99M MEBROFENIN: HCPCS | Performed by: FAMILY MEDICINE

## 2018-08-03 PROCEDURE — 3430000000 HC RX DIAGNOSTIC RADIOPHARMACEUTICAL: Performed by: FAMILY MEDICINE

## 2018-08-03 PROCEDURE — 78227 HEPATOBIL SYST IMAGE W/DRUG: CPT

## 2018-08-03 PROCEDURE — 6360000004 HC RX CONTRAST MEDICATION: Performed by: FAMILY MEDICINE

## 2018-08-03 PROCEDURE — 2580000003 HC RX 258: Performed by: FAMILY MEDICINE

## 2018-08-03 RX ADMIN — SODIUM CHLORIDE 2.18 MCG: 9 INJECTION, SOLUTION INTRAVENOUS at 12:40

## 2018-08-03 RX ADMIN — MEBROFENIN 4 MILLICURIE: 45 INJECTION, POWDER, LYOPHILIZED, FOR SOLUTION INTRAVENOUS at 11:40

## 2018-08-07 ENCOUNTER — OFFICE VISIT (OUTPATIENT)
Dept: SURGERY | Age: 23
End: 2018-08-07
Payer: COMMERCIAL

## 2018-08-07 VITALS
RESPIRATION RATE: 18 BRPM | WEIGHT: 244 LBS | DIASTOLIC BLOOD PRESSURE: 90 MMHG | HEART RATE: 86 BPM | SYSTOLIC BLOOD PRESSURE: 140 MMHG | BODY MASS INDEX: 41.66 KG/M2 | HEIGHT: 64 IN

## 2018-08-07 DIAGNOSIS — R10.9 PAIN, ABDOMINAL, NONSPECIFIC: Primary | ICD-10-CM

## 2018-08-07 PROCEDURE — 99203 OFFICE O/P NEW LOW 30 MIN: CPT | Performed by: SURGERY

## 2018-08-07 RX ORDER — TRAMADOL HYDROCHLORIDE 50 MG/1
50 TABLET ORAL EVERY 6 HOURS PRN
COMMUNITY
End: 2018-09-07 | Stop reason: ALTCHOICE

## 2018-08-07 RX ORDER — ONDANSETRON 4 MG/1
4 TABLET, FILM COATED ORAL EVERY 8 HOURS PRN
Qty: 50 TABLET | Refills: 2 | Status: SHIPPED | OUTPATIENT
Start: 2018-08-07 | End: 2018-08-28

## 2018-08-07 NOTE — LETTER
[address]  [address] [place] [address]  Phone: [phone]  Fax: [phone]    Naveen Maciel MD        September 3, 2018     Yoko Arellano MD  [address]    Patient: Logan Matamoros  MR Number: [id number]  YOB: 1995  Date of Visit: 8/7/2018    Dear Dr. Yoko Arellano:    Thank you for the request for consultation for Marilu Vallecillo to me for the evaluation of abdominal pain, ER follow up. Below are the relevant portions of my assessment and plan of care. Assessment:      Diagnosis Orders   1. Pain, abdominal, nonspecific           Plan: Will consider EGD over next few weeks. Risks, benefits, alternatives thoroughly reviewed and accepted by Ms Lima, including remote risk of GI bleeding, perforation, missed lesions, etc.  If negative, will request gallbladder US. Discussed importance of a high fiber low fat diet with supplemental fiber, tobacco cessation. If you have questions, please do not hesitate to call me. I look forward to following Diego Cavazos along with you.     Sincerely,        Naveen Maciel MD

## 2018-09-03 ASSESSMENT — ENCOUNTER SYMPTOMS
SORE THROAT: 0
VOMITING: 0
NAUSEA: 1
BLOOD IN STOOL: 0
BACK PAIN: 0
TROUBLE SWALLOWING: 0
ABDOMINAL PAIN: 1
CHOKING: 0
COUGH: 0
SHORTNESS OF BREATH: 0

## 2018-09-03 NOTE — PROGRESS NOTES
exhibits no edema. Lymphadenopathy:     She has no cervical adenopathy. Neurological: She is alert and oriented to person, place, and time. No cranial nerve deficit. Skin: Skin is warm and dry. No rash noted. No erythema. Psychiatric: She has a normal mood and affect. Her behavior is normal. Judgment and thought content normal.   Nursing note and vitals reviewed. NM HEPATOBILIARY SCAN W EJECTION FRACTION   Status: Final result   Order Providers     Authorizing Encounter Billing   Yeny Haider DO [facility] NUCLEAR ROOM Enedina Iglesias MD          Signed by     Signed Date/Time  Phone Pager   Monisha Sanchez 3/79/5005 14:31 [phone]    [facility]     Read Date Phone Pager   Monisha Sanchez Aug 3, 2018 [phone]    Reviewed By List     Yeny Haider DO on 8/5/2018 00:22   Yeny Haider DO on 8/5/2018 00:22   Routing History     Priority Sent On From To Message Type    8/3/2018  2:33 PM Ignacio, M[facility]pn Incoming Radiant Results From [place] [id number], DO Results   Radiation Dose Estimates     No radiation information found for this patient   Narrative   HIDA SCAN WITH GALLBLADDER EJECTION FRACTION       HISTORY: Abdominal Pain.       COMPARISON: CT 7/23/2018.       METHOD: Following IV injection of 4.5 mCi of technetium-99m-Choletec, anterior    imaging of the abdomen was acquired for 60 minutes. After the gallbladder was    visualized the patient was injected IV with 2.18 mcg of Kinevac and the    gallbladder ejection fraction was calculated.           FINDINGS: There is satisfactory uptake of radiopharmaceutical by the liver. The    gallbladder, bile duct, and bowel are seen in the expected period of time and    sequence.       The gallbladder ejection fraction is normal at 81%.             Impression       Normal hepatic biliary scintigraphy and gallbladder ejection fraction.      CT ABDOMEN PELVIS WO CONTRAST Additional Contrast? None   Status: Final result Order Providers     Authorizing Encounter Billing   Micah Roberson MD [facility] CAT SCAN ROOM Micah Roberson MD          Signed by     Signed Date/Time  Phone Pager   Wanda Gastelum 7/23/2018 13:40 [phone]    [facility]     Read Date Phone Pager   Wanda Gastelum Jul 23, 2018 [phone]    Reviewed By Ivonne Roberson MD on 7/24/2018 12:48   Micah Roberson MD on 7/24/2018 12:48   Routing History     Priority Sent On From To Message Type    7/23/2018  1:15 PM Ignacio, M[facility]pn Incoming Radiant Results From [facility] Micah Roberson MD Results   Radiation Dose Estimates     No radiation information found for this patient   Narrative   CT ABDOMEN AND PELVIS WITHOUT IV CONTRAST, 7/23/2018       COMPARISON: None.       ADDITIONAL HISTORY: Right upper quadrant abdominal pain (R10.11); nausea.           TECHNIQUE: Helical imaging was performed from above the kidneys through the    pubic symphysis without intravenous or oral contrast. Multiplanar    reconstructions were created. Tayo Lester reduction techniques were achieved by    using: automated exposure control and/or adjustment of mA and/or kV according    to patient size and/or use of iterative reconstruction technique.           FINDINGS:       CT ABDOMEN: The heart is normal in size and the abdominal aorta normal in    caliber. The lung bases are clear and there is no pleural effusion.       No renal calculi, ureteral calculi, or hydronephrosis is identified. No    significant abnormality is identified in the kidneys, adrenal glands, pancreas,    spleen, or liver. No calcified gallstones, gallbladder wall thickening, or    biliary ductal dilatation is identified. No dilated bowel loops, ascites, or    lymphadenopathy is identified.       CT PELVIS: The appendix is surgically absent. No soft tissue mass,    lymphadenopathy, or free fluid is identified.  The colon is not optimally    evaluated due to lack of distention, but there is no

## 2018-09-07 ENCOUNTER — OFFICE VISIT (OUTPATIENT)
Dept: FAMILY MEDICINE CLINIC | Age: 23
End: 2018-09-07
Payer: COMMERCIAL

## 2018-09-07 VITALS
RESPIRATION RATE: 20 BRPM | HEART RATE: 126 BPM | BODY MASS INDEX: 42.17 KG/M2 | HEIGHT: 64 IN | SYSTOLIC BLOOD PRESSURE: 130 MMHG | DIASTOLIC BLOOD PRESSURE: 80 MMHG | OXYGEN SATURATION: 97 % | WEIGHT: 247 LBS

## 2018-09-07 DIAGNOSIS — F14.20 COCAINE DEPENDENCE WITHOUT COMPLICATION (HCC): Primary | ICD-10-CM

## 2018-09-07 PROCEDURE — 99213 OFFICE O/P EST LOW 20 MIN: CPT | Performed by: INTERNAL MEDICINE

## 2018-09-07 ASSESSMENT — ENCOUNTER SYMPTOMS
HEARTBURN: 0
CONSTIPATION: 0
COUGH: 0
SORE THROAT: 0
NAUSEA: 0
VOMITING: 0
DIARRHEA: 0
SHORTNESS OF BREATH: 0
ABDOMINAL PAIN: 1
BLURRED VISION: 0

## 2018-09-07 NOTE — PROGRESS NOTES
HPI Notes    Name: Nat Brunson  : 1995         Chief Complaint:     Chief Complaint   Patient presents with   [address] Depression    Referral - General     to drug treatment center       History of Present Illness:        Denette Cushing presents to office for evaluation of cocaine dependence. She would like referral to rehab center      She has been snorting cocaine for the past almost one year. She is concerned about potential complications. She also says it's expensive. She has no CP. No SOB. No HA, no N/V  She aslo smokes 1ppd and drinks occasionally. denies IV drug use              Past Medical History:     Past Medical History:   Diagnosis Date    Bronchitis       Reviewed all health maintenance requirements and ordered appropriate tests  Health Maintenance Due   Topic Date Due    HIV screen  2009    Chlamydia screen  2010    DTaP/Tdap/Td vaccine (1 - Tdap) 2013    Pneumococcal med risk (1 of 1 - PPSV23) 2013    Cervical cancer screen  2015    Flu vaccine (1) 2018       Past Surgical History:     Past Surgical History:   Procedure Laterality Date    APPENDECTOMY      TONSILLECTOMY AND ADENOIDECTOMY          Medications:       Prior to Admission medications    Medication Sig Start Date End Date Taking? Authorizing Provider   ondansetron (ZOFRAN ODT) 4 MG disintegrating tablet Take 1 tablet by mouth every 8 hours as needed for Nausea or Vomiting 18  Yes Deena Engel, DO   aspirin-acetaminophen-caffeine (EXCEDRIN MIGRAINE) 570-091-51 MG per tablet Take 2 tablets by mouth every 6 hours as needed for Headaches    Historical Provider, MD        Allergies:       Patient has no known allergies. Social History:     Tobacco:    reports that she has been smoking Cigarettes. She has a 3.00 pack-year smoking history. She has never used smokeless tobacco.  Alcohol:      reports that she drinks alcohol.   Drug Use:  reports that she uses drugs, edema or tenderness. Lymphadenopathy:     She has no cervical adenopathy. Neurological: She is alert and oriented to person, place, and time. Skin: Skin is warm and dry. No rash noted. Psychiatric: She has a normal mood and affect. Her behavior is normal. Judgment normal.   Vitals reviewed. Data:     Lab Results   Component Value Date     08/01/2018    K 3.9 08/01/2018     08/01/2018    CO2 25 08/01/2018    BUN 5 08/01/2018    CREATININE 0.69 08/01/2018    GLUCOSE 104 08/01/2018    PROT 6.3 08/01/2018    LABALBU 3.5 08/01/2018    BILITOT 0.22 08/01/2018    ALKPHOS 84 08/01/2018    AST 18 08/01/2018    ALT 25 08/01/2018     Lab Results   Component Value Date    WBC 13.0 08/01/2018    RBC 4.98 08/01/2018    HGB 13.8 08/01/2018    HCT 42.3 08/01/2018    MCV 84.9 08/01/2018    MCH 27.6 08/01/2018    MCHC 32.5 08/01/2018    RDW 14.1 08/01/2018     08/01/2018    MPV NOT REPORTED 08/01/2018     Lab Results   Component Value Date    TSH 2.25 07/20/2018     Lab Results   Component Value Date    CHOL 123 07/20/2018    HDL 32 07/20/2018    LABA1C 5.6 04/25/2013          Assessment & Plan        Diagnosis Orders   1. Cocaine dependence without complication [facility])  Referral to Substance Abuse Resources     We'll refer to counseling services, she may need inpatient rehab admission  She is agreeable with inpatient rehab. We will coordinate with                 Completed Refills   Requested Prescriptions      No prescriptions requested or ordered in this encounter     No Follow-up on file. No orders of the defined types were placed in this encounter.     Orders Placed This Encounter   Procedures    Referral to Substance Abuse Resources     Referral Priority:   Routine     Referral Type:   Consult for Advice and Opinion     Referral Reason:   Specialty Services Required     Requested Specialty:   Mental Health     Number of Visits Requested:   1         There are no Patient Instructions on file for this visit.     Electronically signed by Alta Núñez MD on 9/7/2018 at 11:35 AM           Completed Refills   Requested Prescriptions      No prescriptions requested or ordered in this encounter

## 2018-10-23 ENCOUNTER — TELEPHONE (OUTPATIENT)
Dept: FAMILY MEDICINE CLINIC | Age: 23
End: 2018-10-23

## 2018-10-23 NOTE — TELEPHONE ENCOUNTER
Pt is asking for a return to work slip with no restrictions. Pt seen at [facility]. Was a 45 day treatment and left at 28 days. That doctor wrote her off until Nov 5 but did not give her a return to work paper. She's asking if you'll do this letter? Health Maintenance   Topic Date Due    HIV screen  06/28/2009    Chlamydia screen  06/28/2010    DTaP/Tdap/Td vaccine (1 - Tdap) 06/28/2013    Pneumococcal med risk (1 of 1 - PPSV23) 06/28/2013    Cervical cancer screen  06/28/2015    Flu vaccine (1) 09/01/2018             (applicable per patient's age: Cancer Screenings, Depression Screening, Fall Risk Screening, Immunizations)    Hemoglobin A1C (%)   Date Value   04/25/2013 5.6     LDL Cholesterol (mg/dL)   Date Value   07/20/2018 61     AST (U/L)   Date Value   08/01/2018 18     ALT (U/L)   Date Value   08/01/2018 25     BUN (mg/dL)   Date Value   08/01/2018 5 (L)      (goal A1C is < 7)   (goal LDL is <100) need 30-50% reduction from baseline     BP Readings from Last 3 Encounters:   09/07/18 130/80   08/07/18 (!) 140/90   08/01/18 118/65    (goal /80)      All Future Testing planned in [facility]:  Lab Frequency Next Occurrence   Basic Metabolic Panel Once 59/79/6321   TSH with Reflex Once 09/11/2018   Lipid Panel Once 09/11/2018       Next Visit Date:  No future appointments.          Patient Active Problem List:     Fasting hypoglycemia     Frequency of urination and polyuria     Polydipsia     Obesity (BMI 35.0-39.9 without comorbidity)     Borderline systolic HTN     Otitis media

## 2020-04-03 ENCOUNTER — TELEMEDICINE (OUTPATIENT)
Dept: FAMILY MEDICINE CLINIC | Age: 25
End: 2020-04-03
Payer: COMMERCIAL

## 2020-04-03 PROCEDURE — 99213 OFFICE O/P EST LOW 20 MIN: CPT | Performed by: INTERNAL MEDICINE

## 2020-04-03 NOTE — PROGRESS NOTES
4/3/2020    TELEHEALTH EVALUATION -- Audio/Visual (During RTRZH-10 public health emergency)    HPI:    Andrews Gaston (:  1995) has requested an audio/video evaluation for the following concern(s):    1. Possible diabetes  2. Numbness and tingling in upper and lower extremities  3. Skin lesions on lower extremities, difficult to heal      The patient complains of experiencing numbness and tingling sensation in her upper and lower extremities for the past several weeks. She also complains of itching and dryness of the skin, especially lower extremities. She has been itching heart and developed open sores. She noticed that this lesions are not healing as quickly as expected. States concerned that might have diabetes. States has a strong family history of diabetes. Also mentions that in the past she was prediabetic. In addition, she gained 20 to 30 pounds over the past 6 months. She reports feeling less energetic, noticed that feels thirsty and drinks a lot of fluids. She would like to be tested for diabetes.         Review of Systems     General: Positive for fatigue, weight gain, no fever/chills, no problems with appetite  Head: No dizziness, no headaches  Eyes: Negative for visual disturbances  Ears: No earaches, no tinnitus  Mouth/throat: Positive for dryness and mild, negative for mild lesions  Lungs: Negative for shortness of breath, cough, wheezing  CVS: Negative chest pain, negative palpitations  GI: Negative abdominal pain/distention, positive for occasional nausea, negative for vomiting, negative for constipation  Urological: Negative for kidney stones, negative for dysuria  Musculoskeletal: Negative for joint pains/deformities  Endocrinology: Positive for polydipsia and polyphagia  Hematology: Negative for anemia, negative for bleeding and bruises  Psychiatric: Negative for depression, negative for anxiety  Neurological: Negative for abnormal speech, positive for numbness/tingling in upper

## 2020-04-14 ENCOUNTER — TELEPHONE (OUTPATIENT)
Dept: PRIMARY CARE CLINIC | Age: 25
End: 2020-04-14

## 2020-06-09 ENCOUNTER — OFFICE VISIT (OUTPATIENT)
Dept: FAMILY MEDICINE CLINIC | Age: 25
End: 2020-06-09
Payer: COMMERCIAL

## 2020-06-09 VITALS
WEIGHT: 254 LBS | RESPIRATION RATE: 18 BRPM | HEART RATE: 97 BPM | HEIGHT: 63 IN | BODY MASS INDEX: 45 KG/M2 | OXYGEN SATURATION: 97 % | DIASTOLIC BLOOD PRESSURE: 103 MMHG | SYSTOLIC BLOOD PRESSURE: 150 MMHG

## 2020-06-09 PROCEDURE — 99213 OFFICE O/P EST LOW 20 MIN: CPT | Performed by: INTERNAL MEDICINE

## 2020-06-09 RX ORDER — CITALOPRAM 10 MG/1
10 TABLET ORAL DAILY
Qty: 30 TABLET | Refills: 3 | Status: SHIPPED | OUTPATIENT
Start: 2020-06-09 | End: 2020-08-07 | Stop reason: DRUGHIGH

## 2020-06-09 RX ORDER — HYDROXYZINE HYDROCHLORIDE 25 MG/1
25 TABLET, FILM COATED ORAL EVERY 8 HOURS PRN
Qty: 90 TABLET | Refills: 1 | Status: SHIPPED | OUTPATIENT
Start: 2020-06-09 | End: 2020-08-07 | Stop reason: SINTOL

## 2020-06-09 SDOH — ECONOMIC STABILITY: INCOME INSECURITY: HOW HARD IS IT FOR YOU TO PAY FOR THE VERY BASICS LIKE FOOD, HOUSING, MEDICAL CARE, AND HEATING?: NOT HARD AT ALL

## 2020-06-09 SDOH — ECONOMIC STABILITY: TRANSPORTATION INSECURITY
IN THE PAST 12 MONTHS, HAS THE LACK OF TRANSPORTATION KEPT YOU FROM MEDICAL APPOINTMENTS OR FROM GETTING MEDICATIONS?: NO

## 2020-06-09 SDOH — ECONOMIC STABILITY: FOOD INSECURITY: WITHIN THE PAST 12 MONTHS, THE FOOD YOU BOUGHT JUST DIDN'T LAST AND YOU DIDN'T HAVE MONEY TO GET MORE.: NEVER TRUE

## 2020-06-09 SDOH — ECONOMIC STABILITY: TRANSPORTATION INSECURITY
IN THE PAST 12 MONTHS, HAS LACK OF TRANSPORTATION KEPT YOU FROM MEETINGS, WORK, OR FROM GETTING THINGS NEEDED FOR DAILY LIVING?: NO

## 2020-06-09 SDOH — ECONOMIC STABILITY: FOOD INSECURITY: WITHIN THE PAST 12 MONTHS, YOU WORRIED THAT YOUR FOOD WOULD RUN OUT BEFORE YOU GOT MONEY TO BUY MORE.: NEVER TRUE

## 2020-06-09 ASSESSMENT — ENCOUNTER SYMPTOMS
SORE THROAT: 0
NAUSEA: 0
ABDOMINAL PAIN: 0
SHORTNESS OF BREATH: 0
COUGH: 0

## 2020-06-09 ASSESSMENT — PATIENT HEALTH QUESTIONNAIRE - PHQ9
SUM OF ALL RESPONSES TO PHQ9 QUESTIONS 1 & 2: 0
1. LITTLE INTEREST OR PLEASURE IN DOING THINGS: 0
SUM OF ALL RESPONSES TO PHQ QUESTIONS 1-9: 0
SUM OF ALL RESPONSES TO PHQ QUESTIONS 1-9: 0
2. FEELING DOWN, DEPRESSED OR HOPELESS: 0

## 2020-06-09 NOTE — PROGRESS NOTES
HPI Notes    Name: Andrews Gaston  : 1995         Chief Complaint:     Chief Complaint   Patient presents with   [address] Problem       History of Present Illness:        Sven العلي presents to office for evaluation of depression and anxiety.  has a h/o depression and anxiety in the past  Was an cocaine addict at age 20, used it for only one year,  went to rehab and it helped her to overcome the problem. Has never been on depression meds. States depression really bothers her more than anxiety. States she is upset that her friends got  and \" moved on\" but she is still alone. Sven العلي presents with the complaint of depression. Current symptoms of depression include anhedonia, depressed mood, difficulty concentrating, fatigue, feelings of worthlessness/guilt, hypersomnia and suicidal thoughts without plan. Sven العلي has felt depressed for 1 years. Sven العلي does have a history of depression. There is not a family history of depression or anxiety. Current identifiable stressors include being alone, not feeling loved or cared for. Sven العلي denies the use of drugs or alcohol. Sven العلي admits to current suicidal ideation.  sometimes feels like does not want to live anymore. Has no specific plan to commit suicide. Has no h/o attempted suicide.  lives with her mother who is very supportive. States she does not think that she would ever hurt herself and just having thoughts.   Declined counseling or psychiatry referral.                 Past Medical History:     Past Medical History:   Diagnosis Date    Bronchitis       Reviewed all health maintenance requirements and orderedappropriate tests  Health Maintenance Due   Topic Date Due    Cervical cancer screen  2015       Past Surgical History:     Past Surgical History:   Procedure Laterality Date    APPENDECTOMY      TONSILLECTOMY AND ADENOIDECTOMY          Medications:       Prior to Admission medications Medication Sig Start Date End Date Taking? Authorizing Provider   citalopram (CELEXA) 10 MG tablet Take 1 tablet by mouth daily 6/9/20  Yes Aristeo Hernandez MD   hydrOXYzine (ATARAX) 25 MG tablet Take 1 tablet by mouth every 8 hours as needed for Itching 6/9/20 6/19/20 Yes Aristeo Hernandez MD   ondansetron (ZOFRAN ODT) 4 MG disintegrating tablet Take 1 tablet by mouth every 8 hours as needed for Nausea or Vomiting 8/1/18  Yes Luis Felipe Medicine, DO   aspirin-acetaminophen-caffeine (EXCEDRIN MIGRAINE) 167-786-95 MG per tablet Take 2 tablets by mouth every 6 hours as needed for Headaches   Yes Historical Provider, MD        Allergies:       Patient has no known allergies. Social History:     Tobacco: reports that she has been smoking cigarettes. She has a 3.00 pack-year smoking history. She has never used smokeless tobacco.  Alcohol:      reports current alcohol use. Drug Use:  reports current drug use. Frequency: 7.00 times per week. Drug: Cocaine. Family History:     Family History   Problem Relation Age of Onset    Diabetes Maternal Grandmother     Diabetes Maternal Grandfather     Diabetes Paternal Grandmother     Diabetes Paternal Grandfather        Review of Systems:         Review of Systems   Constitutional: Negative for activity change, appetite change, chills, fatigue, fever and unexpected weight change. HENT: Negative for congestion and sore throat. Respiratory: Negative for cough and shortness of breath. Cardiovascular: Negative for chest pain and palpitations. Gastrointestinal: Negative for abdominal pain and nausea. Genitourinary: Negative for dysuria. Skin: Negative for rash. Neurological: Negative for dizziness and headaches. Psychiatric/Behavioral: Positive for decreased concentration, dysphoric mood and suicidal ideas. Negative for agitation, behavioral problems, hallucinations, self-injury and sleep disturbance. The patient is nervous/anxious.           Physical Exam: & Plan        Diagnosis Orders   1. Depression, unspecified depression type   We will start the patient on Celexa for depression and as needed hydroxyzine for episodes of anxiety. Side effects from medications discussed. Check TSH. I advised referral to a counselor and/or psychiatrist, however she declined at this time. She would like to try medications first.  She is also strongly advised to seek immediate attention, call 911 if experiences suicidal thoughts. We will follow-up in about 4 weeks. She is asked to call us if develops any problems with medications. TSH with Reflex    citalopram (CELEXA) 10 MG tablet   2. Elevated blood pressure reading   Patient believes her blood pressure is elevated due to her anxiety state. We will follow-up in the future. No medications prescribed at this time. Completed Refills   Requested Prescriptions     Signed Prescriptions Disp Refills    citalopram (CELEXA) 10 MG tablet 30 tablet 3     Sig: Take 1 tablet by mouth daily    hydrOXYzine (ATARAX) 25 MG tablet 90 tablet 1     Sig: Take 1 tablet by mouth every 8 hours as needed for Itching     Return in about 4 weeks (around 7/7/2020) for anxiety, depression. Orders Placed This Encounter   Medications    citalopram (CELEXA) 10 MG tablet     Sig: Take 1 tablet by mouth daily     Dispense:  30 tablet     Refill:  3    hydrOXYzine (ATARAX) 25 MG tablet     Sig: Take 1 tablet by mouth every 8 hours as needed for Itching     Dispense:  90 tablet     Refill:  1     Orders Placed This Encounter   Procedures    TSH with Reflex     Standing Status:   Future     Standing Expiration Date:   6/9/2021         There are no Patient Instructions on file for this visit.     Electronically signed by Annmarie Lucio MD on 6/9/2020 at 12:10 PM           Completed Refills      Requested Prescriptions     Signed Prescriptions Disp Refills    citalopram (CELEXA) 10 MG tablet 30 tablet 3     Sig: Take 1 tablet by

## 2020-06-19 ENCOUNTER — TELEPHONE (OUTPATIENT)
Dept: FAMILY MEDICINE CLINIC | Age: 25
End: 2020-06-19

## 2020-08-07 ENCOUNTER — VIRTUAL VISIT (OUTPATIENT)
Dept: FAMILY MEDICINE CLINIC | Age: 25
End: 2020-08-07

## 2020-08-07 PROCEDURE — G2012 BRIEF CHECK IN BY MD/QHP: HCPCS | Performed by: INTERNAL MEDICINE

## 2020-08-07 RX ORDER — CITALOPRAM 20 MG/1
20 TABLET ORAL DAILY
Qty: 30 TABLET | Refills: 3 | Status: SHIPPED | OUTPATIENT
Start: 2020-08-07

## 2020-08-07 NOTE — PROGRESS NOTES
Vanessa Duncan is a 31 y.o. female evaluated via telephone on 8/7/2020. Consent:  She and/or health care decision maker is aware that that she may receive a bill for this telephone service, depending on her insurance coverage, and has provided verbal consent to proceed: Yes      Documentation:  I communicated with the patient and/or health care decision maker about depression. Details of this discussion including any medical advice provided:     Cornell Louise presents as a follow up on her depression. Current medication for depression includes Celexa. Cornell Louise has been on this medication for few months . The medication is  being tolerated well. Since starting the antidepressant the symptoms of depression have improved. Cornell Louise  is not in counciling. Cornell Louise denies suicidal ideation. She states Celexa initially helped but now stopped working. She would like to have her dose increased. Also states at workplace wearing a mask which makes her very anxious. I affirm this is a Patient Initiated Episode with a Patient who has not had a related appointment within my department in the past 7 days or scheduled within the next 24 hours.     Patient identification was verified at the start of the visit: Yes    Total Time: minutes: 5-10 minutes    Note: not billable if this call serves to triage the patient into an appointment for the relevant concern      Cecelia Vergara